# Patient Record
Sex: FEMALE | Race: WHITE | NOT HISPANIC OR LATINO | Employment: OTHER | URBAN - METROPOLITAN AREA
[De-identification: names, ages, dates, MRNs, and addresses within clinical notes are randomized per-mention and may not be internally consistent; named-entity substitution may affect disease eponyms.]

---

## 2017-11-15 ENCOUNTER — AMBULATORY - HEALTHEAST (OUTPATIENT)
Dept: NEUROSURGERY | Facility: CLINIC | Age: 72
End: 2017-11-15

## 2017-11-16 ENCOUNTER — RECORDS - HEALTHEAST (OUTPATIENT)
Dept: RADIOLOGY | Facility: CLINIC | Age: 72
End: 2017-11-16

## 2017-11-16 ENCOUNTER — OFFICE VISIT - HEALTHEAST (OUTPATIENT)
Dept: NEUROSURGERY | Facility: CLINIC | Age: 72
End: 2017-11-16

## 2017-11-16 DIAGNOSIS — M47.22 CERVICAL SPONDYLOSIS WITH RADICULOPATHY: ICD-10-CM

## 2017-11-16 DIAGNOSIS — M47.12 OSTEOARTHRITIS OF CERVICAL SPINE WITH MYELOPATHY: ICD-10-CM

## 2017-11-16 RX ORDER — POTASSIUM CHLORIDE 1500 MG/1
TABLET, EXTENDED RELEASE ORAL
Status: SHIPPED | COMMUNITY
Start: 2017-09-07

## 2017-11-16 RX ORDER — CHLORTHALIDONE 25 MG/1
25 TABLET ORAL
Status: SHIPPED | COMMUNITY
Start: 2017-04-27

## 2017-11-16 RX ORDER — LOSARTAN POTASSIUM 100 MG/1
TABLET ORAL
Status: SHIPPED | COMMUNITY
Start: 2017-09-07

## 2017-11-16 RX ORDER — GABAPENTIN 300 MG/1
CAPSULE ORAL
Status: SHIPPED | COMMUNITY
Start: 2017-10-09

## 2017-11-16 RX ORDER — GABAPENTIN 300 MG/1
300 CAPSULE ORAL
Status: SHIPPED | COMMUNITY
Start: 2017-11-09

## 2017-11-16 ASSESSMENT — MIFFLIN-ST. JEOR: SCORE: 1135.46

## 2017-12-21 ENCOUNTER — RECORDS - HEALTHEAST (OUTPATIENT)
Dept: ADMINISTRATIVE | Facility: OTHER | Age: 72
End: 2017-12-21

## 2021-05-31 VITALS — HEIGHT: 61 IN | BODY MASS INDEX: 29.27 KG/M2 | WEIGHT: 155 LBS

## 2021-06-14 NOTE — PROGRESS NOTES
NEUROSURGERY CONSULTATION NOTE    11/16/2017     CHIEF COMPLAINT: Cervical radiculopathy    HPI:    Georgette Molina is a very pleasant 72y female who is sent to us in consultation by Emilie Swenson for right cervical radiculopathy.    Georgette notes that her symptoms began approximately 3 months ago with no inciting event.  She describes pain that feels to originate just medial to the right shoulder blade, radiates to the posterior right shoulder, down the lateral aspect of the arm to the elbow, down the posterior aspect of the forearm.  She notes associated numbness and tingling that involve the thumb, index and middle finger.  She notes that she has had similar symptoms to this in the past; 2 years ago she notes that she had these symptoms which lasted 3 weeks and then spontaneously resolved.    For her pain she has been taking ibuprofen, and alternating this with Tylenol and feels she is seeing some benefit.  She notes that she started gabapentin on October 15 and after a week long taper has been taking 300 mg twice daily with significant benefit.  She notes the gabapentin helps the most with the numbness and tingling sensation in her hand and fingers.  She states that overall, her pain is gradually improving.  She notes that there are some days where she feels like her pain is completely gone but notes that she does still have daily flares.  She notes that her pain is better in the morning and worse at night.  She denies any overt weakness.  Georgette has undergone a cervical spine MRI which demonstrates cervical spondylosis and foraminal stenosis.  She presents to clinic today to discuss potential surgical options.    Past Medical History:   Diagnosis Date     Breast cancer     [place] lymph node negative, stage I     GERD (gastroesophageal reflux disease)      Hypertension      Osteopenia      Scoliosis      Past Surgical History:   Procedure Laterality Date     APPENDECTOMY       BREAST LUMPECTOMY Left      With sentinel node biopsy     TONSILLECTOMY       REVIEW OF SYSTEMS:  Pt denies any significant neck pain or symptoms in her left arm. Pt notes that her pain is worsened with movement, she notes that she has been avoiding her regular daily upper extremity exercises as it aggravates her pain.  She denies any overt incoordination, no recurrent falls.  A full 14 point review of systems was otherwise completed and is negative aside from that mentioned above in the HPI    MEDICATIONS:    Current Outpatient Prescriptions   Medication Sig Dispense Refill     aspirin 81 MG EC tablet Take 81 mg by mouth.       chlorthalidone (HYGROTEN) 25 MG tablet Take 25 mg by mouth.       cholecalciferol, vitamin D3, 1,000 unit capsule Take 1,000 Units by mouth.       fluticasone (FLONASE) 50 mcg/actuation nasal spray Inhale 1 Spray into both nostrils once daily.       gabapentin (NEURONTIN) 300 MG capsule Take 300 mg by mouth.       gabapentin (NEURONTIN) 300 MG capsule        losartan (COZAAR) 100 MG tablet        potassium chloride SA (K-DUR,KLOR-CON) 20 MEQ tablet        ranitidine (ZANTAC) 150 MG tablet        No current facility-administered medications for this visit.          ALLERGIES/SENSITIVITIES:     Allergies   Allergen Reactions     Lisinopril Cough     no further  ace inhibitors use noted     Adhesive Rash       PERTINENT SOCIAL HISTORY:   Patient lives in [place] for the winter, from late November-April  Social History     Social History     Marital status:      Spouse name: N/A     Number of children: N/A     Years of education: N/A     Social History Main Topics     Smoking status: Never Smoker     Smokeless tobacco: Never Used     Alcohol use Yes      Comment: Rare     Drug use: No     Sexual activity: No     Other Topics Concern     None     Social History Narrative     None         FAMILY HISTORY:  Family History   Problem Relation Age of Onset     Osteoporosis Mother      Bone cancer Maternal Aunt       "Lymphoma Paternal Aunt         PHYSICAL EXAM:     Constitution: /74  Pulse 72  Resp 18  Ht 5' 1\" (1.549 m)  Wt 155 lb (70.3 kg)  BMI 29.29 kg/m2.   Awake, alert and in NAD  Eyes: Conjugate gaze. Conjunctiva benign without icterus or injection  Heart: RRR  Lungs: Non-labored respiration without accessory muscle use  Skin: No obvious rash or lesion  Psych: Appropriate mood and affect, alert and oriented x 3  Mental Status:  Speech is fluent.  Recent and remote memory are intact.  Attention span and concentration are normal.     Cranial Nerves:  CN2: No funduscopic exam performed. CN3,4 & 6: Pupillary light response, lateral and vertical gaze normal.  No nystagmus. CN5: Intact to touch CN7: No facial weakness, smile, facial symmetry intact. CN8: Intact to spoken voice.      Motor: Normal bulk and tone all muscle groups of upper and lower extremities.     Right Left  Right Left   Deltoid 4* 5 Hip flexion 4+ 4++   Biceps 4+ 4+ Hip extension 5 5   Triceps 5 5 Knee flexion 5 5   Wrist ex 5 5 Knee ex 5 5   Wrist flex 5 5 Dorsiflex 5 5   Finger ex 4+ 4+ Plantar flex 5 5   Hand intrinsic 4+ 4+ EHL 5 5    4+         Pt feels right deltoid weakness is pain limited     Sensory: Sensation intact bilaterally to light touch and temperature throughout. Sensation is intact to pinprick in the bilateral LE.  Diminished vibratory sense in the toes bilaterally     Coordination: Gait is normal. Patient is able to heel and toe walk. She has incoordination w tandem gait. Fine motor movements of the UE are diminished in speed and amplitude. Dysdiadochokinesia of the bilateral UE.  Positive Romberg     Reflexes: 3+ supinator, biceps, triceps, knee/ ankle jerk intact. Bilateral hoffmans.  Toes are equivocal in response to plantar stimulation.  No clonus.    IMAGING: I personally reviewed all radiographic images    MRI cervical spine 11/2/2017: There is a very mild cervical kyphosis of 4 . There is cervical spondylosis noted most " prominent at C5-6, C6-7.  At C5-6 there is a broad-based paracentral disc bulge which does not appear to abut the ventral cord, there is severe right foraminal stenosis, moderate to severe left foraminal stenosis.  At C6-7 there is again a broad-based paracentral disc bulge which encroaches upon the ventral cord, overall there is mild to moderate central canal stenosis.  At this level, there is severe right foraminal stenosis and moderate left foraminal stenosis.    CONSULTATION ASSESSMENT AND PLAN:    Georgette Molina is a very pleasant 72-year-old female with a past medical history significant for osteopenia who presents for evaluation of cervical spondylosis and cervical radiculopathy, other exam findings concerning for asymptomatic cervical myelopathy.    I reviewed Georgette's cervical spine imaging findings with her today in clinic and noted that she has severe foraminal stenosis at C5-6 and C6-7 on the right, which could be causing nerve impingement and her radiating pain.  Given her biceps and deltoid weakness, I believe she may be more symptomatic from a C6 radiculopathy.  I did review surgical options and noted that for adequate decompression the neural foramen she would likely require an anterior cervical discectomy and fusion.  I did discuss the steps of this procedure and some of the associated risks.  For now, Georgette would like to continue with all forms of conservative therapy as long as she is not worsening.  I think this is reasonable option and recommended physical therapy as well as a transforaminal epidural steroid injection.  For an injection I would start on the right at C5-6.  If she experienced no benefit or only partial benefit, I would then commended injection on the right at C6-7.  Additionally noted that Georgette could increase her gabapentin and potentially double her dose at nighttime, noting that if she had side effects and she could go back to her prior dosing.  Georgette notes that she and her   are leaving for [place] for the winter in 1 week's time.  I provided her with an external referral for physical therapy so that she can begin this in [place] if she so desires.  She states she would like to hold off on the steroid injection for now.  Additionally and noted her symptoms of imbalance and incoordination, which she does not appreciate on a day-to-day basis.  I noted that given her history of chemotherapy, it is possible that she could have a peripheral neuropathy it is contributing to this.  However, she does have other signs suggestive of possible cervical myelopathy such as her hyperreflexia.  On her present MRI, her central canal stenosis is not severe, but she does have some mild cervical kyphosis and given her anatomic alignment it is possible that she may have some cord abutment when she is seated upright in a neutral position.  Given that she is asymptomatic, I would not recommend any further intervention at this point in time.  However I would recommend that she undergo annual physical examination to ensure that her symptoms are not worsening.  When he notes that she will contact us in April when she returns from [place] if she continues to have difficulty related to cervical radiculopathy.  I otherwise would like to see her back in 1 years time for reevaluation of her incoordination and imbalance with possible cervical myelopathy.    I spent more than 60 minutes in this apt, examining the pt, reviewing the scans, reviewing notes from chart, discussing treatment options with risks and benefits and coordinating care. >50 % clinic time was spent in face to face counseling and coordinating care    Lolis Peter MD      Cc:   Emilie Swenson MD

## 2021-06-14 NOTE — PROGRESS NOTES
Neurosurgery consultation was requested by: Dr. Swenson for evaluation of cervical spinal stenosis  Pain: presents in the right sided neck over the last 3 months - was a gradual onset with no triggers  Radicular Pain is present: in the right shoulder blade, elbow and lateral wrist - intermittent non positional ache  Lhermitte sign: denies  Motor complaints: some weakness in the hand  Sensory complaints: denies  Gait and balance issues: absent  Bowel or bladder issues: denies  Duration of SX is: for 3 mo  The symptoms are worse with: activities  The symptoms are better with: rest  Injury: denies  Severity is: mild to moderate  Patient has tried the following conservative measures: NSAIDS  NDI score is : 36%  Omaira Muro RN, CNRN

## 2023-07-16 ENCOUNTER — APPOINTMENT (OUTPATIENT)
Dept: GENERAL RADIOLOGY | Facility: OTHER | Age: 78
End: 2023-07-16
Attending: PHYSICIAN ASSISTANT
Payer: COMMERCIAL

## 2023-07-16 ENCOUNTER — HOSPITAL ENCOUNTER (EMERGENCY)
Facility: OTHER | Age: 78
Discharge: HOME | End: 2023-07-16
Attending: PHYSICIAN ASSISTANT | Admitting: PHYSICIAN ASSISTANT
Payer: COMMERCIAL

## 2023-07-16 VITALS
DIASTOLIC BLOOD PRESSURE: 70 MMHG | WEIGHT: 155 LBS | TEMPERATURE: 97.3 F | HEART RATE: 74 BPM | SYSTOLIC BLOOD PRESSURE: 150 MMHG | RESPIRATION RATE: 18 BRPM | BODY MASS INDEX: 29.29 KG/M2 | OXYGEN SATURATION: 99 %

## 2023-07-16 DIAGNOSIS — J40 BRONCHITIS: ICD-10-CM

## 2023-07-16 LAB
FLUAV RNA SPEC QL NAA+PROBE: NEGATIVE
FLUBV RNA RESP QL NAA+PROBE: NEGATIVE
RSV RNA SPEC NAA+PROBE: NEGATIVE
SARS-COV-2 RNA RESP QL NAA+PROBE: NEGATIVE

## 2023-07-16 PROCEDURE — 87637 SARSCOV2&INF A&B&RSV AMP PRB: CPT | Performed by: PHYSICIAN ASSISTANT

## 2023-07-16 PROCEDURE — 999N000157 HC STATISTIC RCP TIME EA 10 MIN

## 2023-07-16 PROCEDURE — 250N000009 HC RX 250: Performed by: PHYSICIAN ASSISTANT

## 2023-07-16 PROCEDURE — 250N000011 HC RX IP 250 OP 636: Mod: JZ | Performed by: PHYSICIAN ASSISTANT

## 2023-07-16 PROCEDURE — 71045 X-RAY EXAM CHEST 1 VIEW: CPT

## 2023-07-16 PROCEDURE — 99284 EMERGENCY DEPT VISIT MOD MDM: CPT | Mod: 25 | Performed by: PHYSICIAN ASSISTANT

## 2023-07-16 PROCEDURE — 94640 AIRWAY INHALATION TREATMENT: CPT

## 2023-07-16 PROCEDURE — 99284 EMERGENCY DEPT VISIT MOD MDM: CPT | Performed by: PHYSICIAN ASSISTANT

## 2023-07-16 PROCEDURE — 96372 THER/PROPH/DIAG INJ SC/IM: CPT | Performed by: PHYSICIAN ASSISTANT

## 2023-07-16 PROCEDURE — C9803 HOPD COVID-19 SPEC COLLECT: HCPCS | Performed by: PHYSICIAN ASSISTANT

## 2023-07-16 RX ORDER — PREDNISONE 10 MG/1
40 TABLET ORAL DAILY
Qty: 30 TABLET | Refills: 0 | Status: SHIPPED | OUTPATIENT
Start: 2023-07-16

## 2023-07-16 RX ORDER — IPRATROPIUM BROMIDE AND ALBUTEROL SULFATE 2.5; .5 MG/3ML; MG/3ML
3 SOLUTION RESPIRATORY (INHALATION) ONCE
Status: COMPLETED | OUTPATIENT
Start: 2023-07-16 | End: 2023-07-16

## 2023-07-16 RX ORDER — DEXAMETHASONE SODIUM PHOSPHATE 10 MG/ML
6 INJECTION, SOLUTION INTRAMUSCULAR; INTRAVENOUS ONCE
Status: COMPLETED | OUTPATIENT
Start: 2023-07-16 | End: 2023-07-16

## 2023-07-16 RX ORDER — AZITHROMYCIN 250 MG/1
250 TABLET, FILM COATED ORAL DAILY
Qty: 6 TABLET | Refills: 0 | Status: SHIPPED | OUTPATIENT
Start: 2023-07-16

## 2023-07-16 RX ADMIN — IPRATROPIUM BROMIDE AND ALBUTEROL SULFATE 3 ML: .5; 3 SOLUTION RESPIRATORY (INHALATION) at 12:29

## 2023-07-16 RX ADMIN — DEXAMETHASONE SODIUM PHOSPHATE 6 MG: 10 INJECTION, SOLUTION INTRAMUSCULAR; INTRAVENOUS at 12:21

## 2023-07-16 ASSESSMENT — ACTIVITIES OF DAILY LIVING (ADL): ADLS_ACUITY_SCORE: 35

## 2023-07-16 NOTE — DISCHARGE INSTRUCTIONS
-Take Z-claudia for 5 days and prednisone 40mg every morning for 5 days.  You will have extra tablets of prednisone left.    Instymeds codes:  5562001  [id number]      -Use your at home inhaler  -Return to the ER for any worsening of symptoms to include worsening shortness of breath, fever, development chest pain, or any other concerning symptoms

## 2023-07-16 NOTE — ED PROVIDER NOTES
EMERGENCY DEPARTMENT ENCOUNTER      NAME: Georgette Molina  AGE: 78 year old female  YOB: 1945  MRN: [id number]  EVALUATION DATE & TIME: 7/16/2023 11:56 AM    PCP: No Ref-Primary, Physician    ED PROVIDER: Marshal Lama PA-C       CHIEF COMPLAINT:  Chief Complaint   Patient presents with     Cough       FINAL IMPRESSION:  1. Bronchitis        ED COURSE, MEDICAL DECISION MAKING, ASSESSMENT, AND PLAN:      The patient was interviewed and examined.  HPI and physical exam as below.  Differential diagnosis and MDM Key Documentation Elements as below.  Vitals and triage note were reviewed.  BP (!) 150/70   Pulse 74   Temp 97.3  F (36.3  C) (Tympanic)   Resp 18   Wt 70.3 kg (155 lb)   SpO2 99%   BMI 29.29 kg/m      Overall Impression/Treatment Plan/Discharge Info/Follow-up/Medical Necessity for Admission:  Georgette Molina is a pleasant 78 year old female with history of asthma who presents to the ER today complaining of cough.  Patient states that she been having productive cough since last Tuesday.  Having intermittent fevers and chills as well as fatigue.  Patient denying any chest pain, shortness of breath, or dyspnea.  No headache or lightheadedness.  No nausea or vomiting.  Patient has been using her at home inhaler without any significant relief.  Patient states that her  had similar symptoms earlier this week but he has otherwise improved.    Differential includes but is not limited to pneumonia, bronchitis, asthma exacerbation    Patient was afebrile with elevated blood pressure.  Patient was in no acute distress.  Resting comfortably in the exam bed.  Patient with bilateral expiratory wheezing/rhonchi physical exam otherwise normal physical exam.  Chest x-ray today was negative for signs of acute pneumonia.  COVID, RSV, influenza were negative.  Patient given DuoNeb here in the ER as well as IM Decadron with improvement in symptoms.    Assessment/plan:  1. Bronchitis  -Plan is  for patient to take Z-Laurent and prednisone 40 mg daily for 5 days.  Continue at home inhaler.  Return to the ER if any worsening of symptoms.  Patient with no chest pain, shortness of breath, or dyspnea, less likely ACS/MI.    Reassessments, Medications, Interventions, & Response to Treatments:  Improvement of cough and wheezing with nebulizer here in the ER.  Discussed imaging results.    Consultations:  None    Decision Rules, Medical Calculators, and Risk Stratification Tools:  None    MDM Key Documentation Elements for Patient's Evaluation:  1. Differential diagnosis to include high risk considerations: As above  2. Escalation to admission/observation considered: Admission/observation considered, but patient does not meet admission criteria  3. Discussions and management with other clinicians:    3a. Independent interpretation of testing performed by another health professional:  -No  3b. Discussion of management or test interpretation with another health professional: -No  4. Independent interpretation of tests:  Ordering and/or review of 2 test(s)  5. Discussion of test interpretations with radiology:  No  6. History obtained from source other than patient or assessment requiring an independent historian:  No  7. Review of non-ED/external records:  review of 2 records  8. Diagnostic tests considered but not ultimately performed/deferred:  -CBC  9. Prescription medications considered but not prescribed:  -Tessalon Perles  10. Chronic conditions affecting care:  -Asthma  11. Care affected by social determinants of health:  -None    The patient's management involved:   - Laboratory studies  - Imaging studies  - Prescription drug management      Pertinent Labs & Imaging studies reviewed. (See chart for details)  Results for orders placed or performed during the hospital encounter of 07/16/23   XR Chest Port 1 View    Impression    IMPRESSION:  No acute cardiopulmonary disease.      RADHA BASS MD         SYSTEM  ID:  [id number]   Symptomatic Influenza A/B, RSV, & SARS-CoV2 PCR (COVID-19) Nose    Specimen: Nose; Swab   Result Value Ref Range    Influenza A PCR Negative Negative    Influenza B PCR Negative Negative    RSV PCR Negative Negative    SARS CoV2 PCR Negative Negative     No results found for: ABORH      A shared decision making model was used. Time was taken to answer all questions.  Patient and/or associated parties understood and were agreeable to treatment plan.  Plan and all results were discussed. Warning signs and close return precautions to return to the ED given. Copy of results given. Discharged in stable condition. Discharged with discharge instructions outlining plan for further care and follow up.        PPE worn during patient evaluation:  Mask: Yes, surgical  Eye Protection: No  Gown: No  Hair cover: No  Face Shield: No  Patient wearing a mask: yes      MEDICATIONS GIVEN IN THE EMERGENCY:  Medications   ipratropium - albuterol 0.5 mg/2.5 mg/3 mL (DUONEB) neb solution 3 mL (3 mLs Nebulization $Given 7/16/23 1229)   dexamethasone PF (DECADRON) injection 6 mg (6 mg Intramuscular $Given 7/16/23 1221)       NEW PRESCRIPTIONS STARTED AT TODAY'S ER VISIT:  New Prescriptions    AZITHROMYCIN (ZITHROMAX) 250 MG TABLET    Take 1 tablet (250 mg) by mouth daily    PREDNISONE (DELTASONE) 10 MG TABLET    Take 4 tablets (40 mg) by mouth daily          =================================================================    HPI  Georgette Molina is a pleasant 78 year old female with history of asthma who presents to the ER today complaining of cough.  Patient states that she been having productive cough since last Tuesday.  Having intermittent fevers and chills as well as fatigue.  Patient denying any chest pain, shortness of breath, or dyspnea.  No headache or lightheadedness.  No nausea or vomiting.  Patient has been using her at home inhaler without any significant relief. Patient states that her  had similar  symptoms earlier this week but he has otherwise improved.      REVIEW OF SYSTEMS   Review of Systems  As above, otherwise ROS is unremarkable.      PAST MEDICAL HISTORY:  No past medical history on file.    PAST SURGICAL HISTORY:  Past Surgical History:   Procedure Laterality Date     APPENDECTOMY       LUMPECTOMY BREAST Left     With sentinel node biopsy     TONSILLECTOMY         CURRENT MEDICATIONS:    Current Outpatient Medications   Medication Instructions     aspirin (ASA) 81 mg     azithromycin (ZITHROMAX) 250 mg, Oral, DAILY     chlorthalidone (HYGROTON) 25 mg     cholecalciferol (VITAMIN D3) 1,000 Units     fluticasone (FLONASE) 50 mcg/actuation nasal spray [FLUTICASONE (FLONASE) 50 MCG/ACTUATION NASAL SPRAY] Inhale 1 Spray into both nostrils once daily.     gabapentin (NEURONTIN) 300 MG capsule [GABAPENTIN (NEURONTIN) 300 MG CAPSULE]      gabapentin (NEURONTIN) 300 mg     losartan (COZAAR) 100 MG tablet [LOSARTAN (COZAAR) 100 MG TABLET]      potassium chloride SA (K-DUR,KLOR-CON) 20 MEQ tablet [POTASSIUM CHLORIDE SA (K-DUR,KLOR-CON) 20 MEQ TABLET]      predniSONE (DELTASONE) 40 mg, Oral, DAILY     ranitidine (ZANTAC) 150 MG tablet [RANITIDINE (ZANTAC) 150 MG TABLET]        ALLERGIES:  Allergies   Allergen Reactions     Lisinopril Cough     no further  ace inhibitors use noted     Adhesive [Cyanoacrylate] Rash       FAMILY HISTORY:  Family History   Problem Relation Age of Onset     Osteoporosis Mother      Bone Cancer Maternal Aunt      Lymphoma Paternal Aunt        SOCIAL HISTORY:   Social History     Socioeconomic History     Marital status:    Tobacco Use     Smoking status: Never     Smokeless tobacco: Never   Substance and Sexual Activity     Alcohol use: Yes     Comment: Alcoholic Drinks/day: Rare     Drug use: No     Sexual activity: Never       PHYSICAL EXAM    VITAL SIGNS: BP (!) 150/70   Pulse 74   Temp 97.3  F (36.3  C) (Tympanic)   Resp 18   Wt 70.3 kg (155 lb)   SpO2 99%   BMI  29.29 kg/m      Patient Vitals for the past 24 hrs:   BP Temp Temp src Pulse Resp SpO2 Weight   07/16/23 1229 -- -- -- -- -- 99 % --   07/16/23 1151 (!) 150/70 97.3  F (36.3  C) Tympanic 74 18 95 % 70.3 kg (155 lb)       Physical Exam  Vitals and nursing note reviewed.   Constitutional:       General: She is not in acute distress.     Appearance: Normal appearance. She is not ill-appearing.   HENT:      Nose: Nose normal.      Mouth/Throat:      Mouth: Mucous membranes are moist.      Pharynx: Oropharynx is clear.   Eyes:      Conjunctiva/sclera: Conjunctivae normal.   Cardiovascular:      Rate and Rhythm: Normal rate and regular rhythm.      Pulses: Normal pulses.      Heart sounds: Normal heart sounds.   Pulmonary:      Effort: Pulmonary effort is normal.      Breath sounds: Normal breath sounds.      Comments: Patient with bilateral expiratory wheezing/rhonchi.  No respiratory distress.  No chest wall tenderness  Abdominal:      General: Abdomen is flat. Bowel sounds are normal.      Palpations: Abdomen is soft.      Tenderness: There is no abdominal tenderness.   Musculoskeletal:         General: Normal range of motion.      Cervical back: Normal range of motion and neck supple.      Right lower leg: No edema.      Left lower leg: No edema.   Skin:     General: Skin is warm and dry.      Capillary Refill: Capillary refill takes less than 2 seconds.   Neurological:      General: No focal deficit present.      Mental Status: She is alert and oriented to person, place, and time.   Psychiatric:         Mood and Affect: Mood normal.          LABS & RADIOLOGY:  All pertinent labs reviewed and interpreted. Reviewed all pertinent imaging. Please see official radiology report.  Results for orders placed or performed during the hospital encounter of 07/16/23   XR Chest Port 1 View    Impression    IMPRESSION:  No acute cardiopulmonary disease.      RADHA BASS MD         SYSTEM ID:  [id number]   Symptomatic Influenza  A/B, RSV, & SARS-CoV2 PCR (COVID-19) Nose    Specimen: Nose; Swab   Result Value Ref Range    Influenza A PCR Negative Negative    Influenza B PCR Negative Negative    RSV PCR Negative Negative    SARS CoV2 PCR Negative Negative     XR Chest Port 1 View   Final Result   IMPRESSION:  No acute cardiopulmonary disease.        RADHA BASS MD            SYSTEM ID:  [id number]            Steven PÉREZ PA-C, personally performed the services described in this documentation, and it is both accurate and complete.     Marshal Lama PA-C  07/16/23 1301

## 2023-07-16 NOTE — ED TRIAGE NOTES
Pt presents to ED from home for c/o cough since Tuesday. Pt states cough is productive, coughing up green phlegm. Has had fevers on and off this week. Denies pain, afebrile in triage.  BP (!) 150/70   Pulse 74   Temp 97.3  F (36.3  C) (Tympanic)   Resp 18   Wt 70.3 kg (155 lb)   SpO2 95%   BMI 29.29 kg/m         Triage Assessment     Row Name 07/16/23 1151       Triage Assessment (Adult)    Airway WDL WDL       Respiratory WDL    Respiratory WDL X;cough    Cough Frequency frequent    Cough Type productive       Skin Circulation/Temperature WDL    Skin Circulation/Temperature WDL WDL       Cardiac WDL    Cardiac WDL WDL       Peripheral/Neurovascular WDL    Peripheral Neurovascular WDL WDL       Cognitive/Neuro/Behavioral WDL    Cognitive/Neuro/Behavioral WDL WDL

## 2023-09-07 ENCOUNTER — HOSPITAL ENCOUNTER (EMERGENCY)
Facility: OTHER | Age: 78
Discharge: HOME | End: 2023-09-07
Attending: FAMILY MEDICINE | Admitting: FAMILY MEDICINE
Payer: COMMERCIAL

## 2023-09-07 VITALS
HEART RATE: 60 BPM | RESPIRATION RATE: 16 BRPM | OXYGEN SATURATION: 99 % | WEIGHT: 155 LBS | HEIGHT: 61 IN | SYSTOLIC BLOOD PRESSURE: 169 MMHG | DIASTOLIC BLOOD PRESSURE: 73 MMHG | TEMPERATURE: 96.7 F | BODY MASS INDEX: 29.27 KG/M2

## 2023-09-07 DIAGNOSIS — N30.01 ACUTE CYSTITIS WITH HEMATURIA: ICD-10-CM

## 2023-09-07 LAB
ALBUMIN UR-MCNC: 100 MG/DL
APPEARANCE UR: ABNORMAL
BACTERIA #/AREA URNS HPF: ABNORMAL /HPF
BILIRUB UR QL STRIP: NEGATIVE
COLOR UR AUTO: ABNORMAL
GLUCOSE UR STRIP-MCNC: NEGATIVE MG/DL
HGB UR QL STRIP: ABNORMAL
KETONES UR STRIP-MCNC: NEGATIVE MG/DL
LEUKOCYTE ESTERASE UR QL STRIP: ABNORMAL
NITRATE UR QL: NEGATIVE
PH UR STRIP: 7.5 [PH] (ref 5–9)
RBC URINE: >182 /HPF
SP GR UR STRIP: 1.01 (ref 1–1.03)
UROBILINOGEN UR STRIP-MCNC: NORMAL MG/DL
WBC URINE: >182 /HPF

## 2023-09-07 PROCEDURE — 81001 URINALYSIS AUTO W/SCOPE: CPT | Performed by: FAMILY MEDICINE

## 2023-09-07 PROCEDURE — 99283 EMERGENCY DEPT VISIT LOW MDM: CPT

## 2023-09-07 PROCEDURE — 87088 URINE BACTERIA CULTURE: CPT | Performed by: FAMILY MEDICINE

## 2023-09-07 PROCEDURE — 99283 EMERGENCY DEPT VISIT LOW MDM: CPT | Performed by: FAMILY MEDICINE

## 2023-09-07 RX ORDER — SULFAMETHOXAZOLE/TRIMETHOPRIM 800-160 MG
1 TABLET ORAL 2 TIMES DAILY
Qty: 14 TABLET | Refills: 0 | Status: SHIPPED | OUTPATIENT
Start: 2023-09-07

## 2023-09-07 ASSESSMENT — ENCOUNTER SYMPTOMS
FEVER: 0
FREQUENCY: 1
CHILLS: 1

## 2023-09-07 NOTE — DISCHARGE INSTRUCTIONS
Georgette    We will get you home with Bactrim.     Thank you for choosing our Emergency Department for your care.     You may receive a phone call or letter for a survey about your care in our ED.  Please complete this as this is how we improve care for our patients.     If you have any questions after leaving the ED you can call or text me on my cell phone at [phone] and I will get back to you at some point. This does not mean that I am on call and if you are not doing well please return to the ED.     Sincerely,    Dr Joel Salgado M.D.

## 2023-09-07 NOTE — ED PROVIDER NOTES
History     Chief Complaint   Patient presents with    UTI     The history is provided by the patient and medical records.     Georgette Molina is a 78 year old female who is here with pelvic pressure, urine frequency and gross hematuria. Symptoms started last night. She had no fever, some chills and some back ache (she has scoliosis so back ache is not unusual).     She was seen here July 16 for bronchitis and treated with Z-claudia and prednisone.  She has seen urology about three years ago for UTI and her work up was negative for reversible causes.    Allergies:  Allergies   Allergen Reactions    Lisinopril Cough     no further  ace inhibitors use noted    Adhesive [Cyanoacrylate] Rash       Problem List:    There are no problems to display for this patient.       Past Medical History:    No past medical history on file.    Past Surgical History:    Past Surgical History:   Procedure Laterality Date    APPENDECTOMY      LUMPECTOMY BREAST Left     With sentinel node biopsy    TONSILLECTOMY         Family History:    Family History   Problem Relation Age of Onset    Osteoporosis Mother     Bone Cancer Maternal Aunt     Lymphoma Paternal Aunt        Social History:  Marital Status:   [2]  Social History     Tobacco Use    Smoking status: Never    Smokeless tobacco: Never   Substance Use Topics    Alcohol use: Yes     Comment: Alcoholic Drinks/day: Rare    Drug use: No        Medications:    sulfamethoxazole-trimethoprim (BACTRIM DS) 800-160 MG tablet  aspirin 81 MG EC tablet  azithromycin (ZITHROMAX) 250 MG tablet  chlorthalidone (HYGROTEN) 25 MG tablet  cholecalciferol, vitamin D3, 1,000 unit capsule  fluticasone (FLONASE) 50 mcg/actuation nasal spray  gabapentin (NEURONTIN) 300 MG capsule  gabapentin (NEURONTIN) 300 MG capsule  losartan (COZAAR) 100 MG tablet  potassium chloride SA (K-DUR,KLOR-CON) 20 MEQ tablet  predniSONE (DELTASONE) 10 MG tablet  ranitidine (ZANTAC) 150 MG tablet      Review of Systems  "  Constitutional:  Positive for chills. Negative for fever.   Genitourinary:  Positive for frequency.   All other systems reviewed and are negative.      Physical Exam   BP: (!) 182/82  Pulse: 78  Temp: (!) 96.7  F (35.9  C)  Resp: 16  Height: 154.9 cm (5' 1\")  Weight: 70.3 kg (155 lb)  SpO2: 98 %      Physical Exam  Vitals and nursing note reviewed.   Constitutional:       Appearance: Normal appearance.   Cardiovascular:      Rate and Rhythm: Normal rate.      Pulses: Normal pulses.   Pulmonary:      Effort: Pulmonary effort is normal. No respiratory distress.      Breath sounds: Normal breath sounds.   Abdominal:      General: Bowel sounds are normal. There is no distension.      Palpations: Abdomen is soft.      Tenderness: There is no abdominal tenderness. There is no right CVA tenderness, left CVA tenderness or guarding.   Neurological:      Mental Status: She is alert.         Results for orders placed or performed during the hospital encounter of 09/07/23 (from the past 24 hour(s))   UA with Microscopic reflex to Culture    Specimen: Urine, Midstream   Result Value Ref Range    Color Urine Dark Brown (A) Colorless, Straw, Light Yellow, Yellow    Appearance Urine Cloudy (A) Clear    Glucose Urine Negative Negative mg/dL    Bilirubin Urine Negative Negative    Ketones Urine Negative Negative mg/dL    Specific Gravity Urine 1.011 1.000 - 1.030    Blood Urine Large (A) Negative    pH Urine 7.5 5.0 - 9.0    Protein Albumin Urine 100 (A) Negative mg/dL    Urobilinogen Urine Normal Normal, 2.0 mg/dL    Nitrite Urine Negative Negative    Leukocyte Esterase Urine Large (A) Negative    Bacteria Urine Moderate (A) None Seen /HPF    RBC Urine >182 (H) <=2 /HPF    WBC Urine >182 (H) <=5 /HPF    Narrative    Urine Culture ordered based on laboratory criteria       Medications - No data to display    Assessments & Plan (with Medical Decision Making)  Georgette Molina is a 78 year old female who is here with pelvic pressure, " "urine frequency and gross hematuria. Symptoms started last night. She had no fever, some chills and some back ache (she has scoliosis so back ache is not unusual).  She was seen here July 16 for bronchitis and treated with Z-claudia and prednisone.  She has seen urology about three years ago for UTI and her work up was negative for reversible causes.  VS in the ED BP (!) 182/82   Pulse 78   Temp (!) 96.7  F (35.9  C) (Tympanic)   Resp 16   Ht 1.549 m (5' 1\")   Wt 70.3 kg (155 lb)   SpO2 98%   BMI 29.29 kg/m    Exam shows no focal findings. UA shows UTI with hematuria.  She was on Bactrim in the past which worked well with no side effects. She says she had diarrhea from an antibiotic starting with \"c\" (I offered cephalexin vs ciprofloxacin and she could not recall).  She really wants to use Bactrim and that seems reasonable.      I have reviewed the nursing notes.    I have reviewed the findings, diagnosis, plan and need for follow up with the patient.     Medical Decision Making  The patient's presentation was of low complexity (an acute and uncomplicated illness or injury).    The patient's evaluation involved:  an assessment requiring an independent historian (see separate area of note for details)  review of 1 test result(s) ordered prior to this encounter (see separate area of note for details)    The patient's management necessitated moderate risk (prescription drug management including medications given in the ED).        New Prescriptions    SULFAMETHOXAZOLE-TRIMETHOPRIM (BACTRIM DS) 800-160 MG TABLET    Take 1 tablet by mouth 2 times daily       Final diagnoses:   Acute cystitis with hematuria       9/7/2023   [facility] AND [facility], Vipul Dang MD  09/07/23 1033    "

## 2023-09-09 LAB — BACTERIA UR CULT: ABNORMAL

## (undated) RX ORDER — DEXAMETHASONE SODIUM PHOSPHATE 10 MG/ML
INJECTION, SOLUTION INTRAMUSCULAR; INTRAVENOUS
Status: DISPENSED
Start: 2023-07-16

## (undated) RX ORDER — IPRATROPIUM BROMIDE AND ALBUTEROL SULFATE 2.5; .5 MG/3ML; MG/3ML
SOLUTION RESPIRATORY (INHALATION)
Status: DISPENSED
Start: 2023-07-16